# Patient Record
Sex: MALE | Race: WHITE | NOT HISPANIC OR LATINO | Employment: FULL TIME | URBAN - METROPOLITAN AREA
[De-identification: names, ages, dates, MRNs, and addresses within clinical notes are randomized per-mention and may not be internally consistent; named-entity substitution may affect disease eponyms.]

---

## 2017-02-08 RX ORDER — BISOPROLOL FUMARATE 10 MG/1
TABLET, FILM COATED ORAL
Qty: 90 TABLET | Refills: 1 | Status: SHIPPED | OUTPATIENT
Start: 2017-02-08 | End: 2018-05-02 | Stop reason: SDUPTHER

## 2017-05-02 ENCOUNTER — HOSPITAL ENCOUNTER (OUTPATIENT)
Dept: CARDIOLOGY | Facility: HOSPITAL | Age: 42
Discharge: HOME | End: 2017-05-02
Admitting: NURSE PRACTITIONER

## 2017-05-02 ENCOUNTER — OFFICE VISIT (OUTPATIENT)
Dept: INTERNAL MEDICINE | Facility: CLINIC | Age: 42
End: 2017-05-02

## 2017-05-02 VITALS
OXYGEN SATURATION: 98 % | HEIGHT: 76 IN | SYSTOLIC BLOOD PRESSURE: 122 MMHG | HEART RATE: 65 BPM | BODY MASS INDEX: 33 KG/M2 | WEIGHT: 271 LBS | DIASTOLIC BLOOD PRESSURE: 80 MMHG

## 2017-05-02 VITALS — SYSTOLIC BLOOD PRESSURE: 120 MMHG | DIASTOLIC BLOOD PRESSURE: 84 MMHG | HEART RATE: 77 BPM

## 2017-05-02 DIAGNOSIS — R53.83 OTHER FATIGUE: ICD-10-CM

## 2017-05-02 DIAGNOSIS — R07.9 CHEST PAIN, UNSPECIFIED TYPE: ICD-10-CM

## 2017-05-02 DIAGNOSIS — R53.1 WEAKNESS: ICD-10-CM

## 2017-05-02 DIAGNOSIS — R42 DIZZINESS: ICD-10-CM

## 2017-05-02 DIAGNOSIS — E78.5 HYPERLIPIDEMIA, UNSPECIFIED HYPERLIPIDEMIA TYPE: ICD-10-CM

## 2017-05-02 DIAGNOSIS — H53.9 VISION CHANGES: ICD-10-CM

## 2017-05-02 DIAGNOSIS — R07.9 CHEST PAIN, UNSPECIFIED TYPE: Primary | ICD-10-CM

## 2017-05-02 LAB
ALBUMIN SERPL-MCNC: 4.1 G/DL (ref 3.2–4.8)
ALBUMIN/GLOB SERPL: 1.5 G/DL (ref 1.5–2.5)
ALP SERPL-CCNC: 135 U/L (ref 25–100)
ALT SERPL W P-5'-P-CCNC: 34 U/L (ref 7–40)
ANION GAP SERPL CALCULATED.3IONS-SCNC: 11 MMOL/L (ref 3–11)
ARTICHOKE IGE QN: 164 MG/DL (ref 0–130)
AST SERPL-CCNC: 21 U/L (ref 0–33)
BASOPHILS # BLD AUTO: 0.04 10*3/MM3 (ref 0–0.2)
BASOPHILS NFR BLD AUTO: 0.5 % (ref 0–1)
BH CV STRESS BP STAGE 1: NORMAL
BH CV STRESS BP STAGE 2: NORMAL
BH CV STRESS BP STAGE 3: NORMAL
BH CV STRESS DURATION MIN STAGE 1: 3
BH CV STRESS DURATION MIN STAGE 2: 3
BH CV STRESS DURATION MIN STAGE 3: 3
BH CV STRESS DURATION MIN STAGE 4: 1
BH CV STRESS DURATION SEC STAGE 1: 0
BH CV STRESS DURATION SEC STAGE 2: 0
BH CV STRESS DURATION SEC STAGE 3: 0
BH CV STRESS DURATION SEC STAGE 4: 20
BH CV STRESS GRADE STAGE 1: 10
BH CV STRESS GRADE STAGE 2: 12
BH CV STRESS GRADE STAGE 3: 14
BH CV STRESS GRADE STAGE 4: 16
BH CV STRESS HR STAGE 1: 91
BH CV STRESS HR STAGE 2: 109
BH CV STRESS HR STAGE 3: 114
BH CV STRESS HR STAGE 4: 135
BH CV STRESS METS STAGE 1: 5
BH CV STRESS METS STAGE 2: 7.5
BH CV STRESS METS STAGE 3: 10
BH CV STRESS METS STAGE 4: 13.5
BH CV STRESS O2 STAGE 2: 95
BH CV STRESS O2 STAGE 3: 94
BH CV STRESS O2 STAGE 4: 93
BH CV STRESS PROTOCOL 1: NORMAL
BH CV STRESS RECOVERY BP: NORMAL MMHG
BH CV STRESS RECOVERY HR: 88 BPM
BH CV STRESS RECOVERY O2: 93 %
BH CV STRESS SPEED STAGE 1: 1.7
BH CV STRESS SPEED STAGE 2: 2.5
BH CV STRESS SPEED STAGE 3: 3.4
BH CV STRESS SPEED STAGE 4: 4.2
BH CV STRESS STAGE 1: 1
BH CV STRESS STAGE 2: 2
BH CV STRESS STAGE 3: 3
BH CV STRESS STAGE 4: 4
BILIRUB SERPL-MCNC: 0.3 MG/DL (ref 0.3–1.2)
BUN BLD-MCNC: 16 MG/DL (ref 9–23)
BUN/CREAT SERPL: 17.8 (ref 7–25)
CALCIUM SPEC-SCNC: 10.1 MG/DL (ref 8.7–10.4)
CHLORIDE SERPL-SCNC: 108 MMOL/L (ref 99–109)
CHOLEST SERPL-MCNC: 241 MG/DL (ref 0–200)
CO2 SERPL-SCNC: 26 MMOL/L (ref 20–31)
CREAT BLD-MCNC: 0.9 MG/DL (ref 0.6–1.3)
DEPRECATED RDW RBC AUTO: 45.1 FL (ref 37–54)
EOSINOPHIL # BLD AUTO: 0.33 10*3/MM3 (ref 0.1–0.3)
EOSINOPHIL NFR BLD AUTO: 3.7 % (ref 0–3)
ERYTHROCYTE [DISTWIDTH] IN BLOOD BY AUTOMATED COUNT: 13.4 % (ref 11.3–14.5)
GFR SERPL CREATININE-BSD FRML MDRD: 93 ML/MIN/1.73
GLOBULIN UR ELPH-MCNC: 2.7 GM/DL
GLUCOSE BLD-MCNC: 100 MG/DL (ref 70–100)
HCT VFR BLD AUTO: 51.5 % (ref 38.9–50.9)
HDLC SERPL-MCNC: 46 MG/DL (ref 40–60)
HGB BLD-MCNC: 17.2 G/DL (ref 13.1–17.5)
IMM GRANULOCYTES # BLD: 0.01 10*3/MM3 (ref 0–0.03)
IMM GRANULOCYTES NFR BLD: 0.1 % (ref 0–0.6)
LYMPHOCYTES # BLD AUTO: 1.97 10*3/MM3 (ref 0.6–4.8)
LYMPHOCYTES NFR BLD AUTO: 22.3 % (ref 24–44)
MAXIMAL PREDICTED HEART RATE: 178 BPM
MCH RBC QN AUTO: 30.6 PG (ref 27–31)
MCHC RBC AUTO-ENTMCNC: 33.4 G/DL (ref 32–36)
MCV RBC AUTO: 91.6 FL (ref 80–99)
MONOCYTES # BLD AUTO: 0.51 10*3/MM3 (ref 0–1)
MONOCYTES NFR BLD AUTO: 5.8 % (ref 0–12)
NEUTROPHILS # BLD AUTO: 5.99 10*3/MM3 (ref 1.5–8.3)
NEUTROPHILS NFR BLD AUTO: 67.6 % (ref 41–71)
PERCENT MAX PREDICTED HR: 75.28 %
PLATELET # BLD AUTO: 188 10*3/MM3 (ref 150–450)
PMV BLD AUTO: 12.4 FL (ref 6–12)
POTASSIUM BLD-SCNC: 4.8 MMOL/L (ref 3.5–5.5)
PROT SERPL-MCNC: 6.8 G/DL (ref 5.7–8.2)
RBC # BLD AUTO: 5.62 10*6/MM3 (ref 4.2–5.76)
SODIUM BLD-SCNC: 145 MMOL/L (ref 132–146)
STRESS BASELINE BP: NORMAL MMHG
STRESS BASELINE HR: 63 BPM
STRESS PERCENT HR: 89 %
STRESS POST ESTIMATED WORKLOAD: 12.3 METS
STRESS POST EXERCISE DUR MIN: 10 MIN
STRESS POST EXERCISE DUR SEC: 20 SEC
STRESS POST O2 SAT PEAK: 95 %
STRESS POST PEAK BP: NORMAL MMHG
STRESS POST PEAK HR: 134 BPM
STRESS TARGET HR: 151 BPM
TRIGL SERPL-MCNC: 353 MG/DL (ref 0–150)
WBC NRBC COR # BLD: 8.85 10*3/MM3 (ref 3.5–10.8)

## 2017-05-02 PROCEDURE — 80053 COMPREHEN METABOLIC PANEL: CPT | Performed by: NURSE PRACTITIONER

## 2017-05-02 PROCEDURE — 93017 CV STRESS TEST TRACING ONLY: CPT

## 2017-05-02 PROCEDURE — 84403 ASSAY OF TOTAL TESTOSTERONE: CPT | Performed by: NURSE PRACTITIONER

## 2017-05-02 PROCEDURE — 93018 CV STRESS TEST I&R ONLY: CPT | Performed by: INTERNAL MEDICINE

## 2017-05-02 PROCEDURE — 84402 ASSAY OF FREE TESTOSTERONE: CPT | Performed by: NURSE PRACTITIONER

## 2017-05-02 PROCEDURE — 93000 ELECTROCARDIOGRAM COMPLETE: CPT | Performed by: NURSE PRACTITIONER

## 2017-05-02 PROCEDURE — 99214 OFFICE O/P EST MOD 30 MIN: CPT | Performed by: NURSE PRACTITIONER

## 2017-05-02 PROCEDURE — 80061 LIPID PANEL: CPT | Performed by: NURSE PRACTITIONER

## 2017-05-02 PROCEDURE — 85025 COMPLETE CBC W/AUTO DIFF WBC: CPT | Performed by: NURSE PRACTITIONER

## 2017-05-03 DIAGNOSIS — R53.1 WEAKNESS: ICD-10-CM

## 2017-05-03 DIAGNOSIS — R42 DIZZINESS: ICD-10-CM

## 2017-05-03 DIAGNOSIS — H53.9 VISUAL CHANGES: Primary | ICD-10-CM

## 2017-05-04 LAB
CONV COMMENT: NORMAL
TESTOST FREE SERPL-MCNC: 7.4 PG/ML (ref 6.8–21.5)
TESTOST SERPL-MCNC: 390 NG/DL (ref 348–1197)

## 2017-05-09 ENCOUNTER — HOSPITAL ENCOUNTER (OUTPATIENT)
Dept: MRI IMAGING | Facility: HOSPITAL | Age: 42
Discharge: HOME | End: 2017-05-09
Admitting: NURSE PRACTITIONER

## 2017-05-09 DIAGNOSIS — R42 DIZZINESS: ICD-10-CM

## 2017-05-09 DIAGNOSIS — R53.1 WEAKNESS: ICD-10-CM

## 2017-05-09 DIAGNOSIS — H53.9 VISUAL CHANGES: ICD-10-CM

## 2017-05-09 PROCEDURE — A9577 INJ MULTIHANCE: HCPCS | Performed by: NURSE PRACTITIONER

## 2017-05-09 PROCEDURE — 70553 MRI BRAIN STEM W/O & W/DYE: CPT

## 2017-05-09 PROCEDURE — 0 GADOBENATE DIMEGLUMINE 529 MG/ML SOLUTION: Performed by: NURSE PRACTITIONER

## 2017-05-09 RX ADMIN — GADOBENATE DIMEGLUMINE 20 ML: 529 INJECTION, SOLUTION INTRAVENOUS at 15:00

## 2017-05-11 ENCOUNTER — TELEPHONE (OUTPATIENT)
Dept: INTERNAL MEDICINE | Facility: CLINIC | Age: 42
End: 2017-05-11

## 2017-05-11 DIAGNOSIS — H53.9 VISION CHANGES: ICD-10-CM

## 2017-05-11 DIAGNOSIS — R42 DIZZINESS: ICD-10-CM

## 2017-05-11 DIAGNOSIS — R51.9 WORSENING HEADACHES: Primary | ICD-10-CM

## 2017-06-16 RX ORDER — OMEPRAZOLE 20 MG/1
CAPSULE, DELAYED RELEASE ORAL
Qty: 90 CAPSULE | Refills: 3 | Status: SHIPPED | OUTPATIENT
Start: 2017-06-16 | End: 2018-08-18 | Stop reason: SDUPTHER

## 2018-02-22 ENCOUNTER — OFFICE VISIT (OUTPATIENT)
Dept: INTERNAL MEDICINE | Facility: CLINIC | Age: 43
End: 2018-02-22

## 2018-02-22 VITALS
HEART RATE: 88 BPM | DIASTOLIC BLOOD PRESSURE: 82 MMHG | OXYGEN SATURATION: 98 % | BODY MASS INDEX: 33.94 KG/M2 | WEIGHT: 278.8 LBS | SYSTOLIC BLOOD PRESSURE: 124 MMHG

## 2018-02-22 DIAGNOSIS — Z72.0 TOBACCO USE: ICD-10-CM

## 2018-02-22 DIAGNOSIS — G89.29 CHRONIC BILATERAL LOW BACK PAIN WITHOUT SCIATICA: Primary | ICD-10-CM

## 2018-02-22 DIAGNOSIS — G89.29 CHRONIC PAIN OF RIGHT KNEE: ICD-10-CM

## 2018-02-22 DIAGNOSIS — M25.561 CHRONIC PAIN OF RIGHT KNEE: ICD-10-CM

## 2018-02-22 DIAGNOSIS — M62.830 BACK MUSCLE SPASM: ICD-10-CM

## 2018-02-22 DIAGNOSIS — M54.50 CHRONIC BILATERAL LOW BACK PAIN WITHOUT SCIATICA: Primary | ICD-10-CM

## 2018-02-22 DIAGNOSIS — G47.30 SLEEP APNEA, UNSPECIFIED TYPE: ICD-10-CM

## 2018-02-22 PROCEDURE — 99406 BEHAV CHNG SMOKING 3-10 MIN: CPT | Performed by: NURSE PRACTITIONER

## 2018-02-22 PROCEDURE — 99214 OFFICE O/P EST MOD 30 MIN: CPT | Performed by: NURSE PRACTITIONER

## 2018-02-22 PROCEDURE — 96372 THER/PROPH/DIAG INJ SC/IM: CPT | Performed by: NURSE PRACTITIONER

## 2018-02-22 RX ORDER — TIZANIDINE 4 MG/1
4 TABLET ORAL EVERY 6 HOURS PRN
Qty: 20 TABLET | Refills: 0 | Status: SHIPPED | OUTPATIENT
Start: 2018-02-22

## 2018-02-22 RX ORDER — KETOROLAC TROMETHAMINE 30 MG/ML
60 INJECTION, SOLUTION INTRAMUSCULAR; INTRAVENOUS ONCE
Status: DISCONTINUED | OUTPATIENT
Start: 2018-02-22 | End: 2018-02-22

## 2018-02-22 RX ORDER — KETOROLAC TROMETHAMINE 30 MG/ML
60 INJECTION, SOLUTION INTRAMUSCULAR; INTRAVENOUS ONCE
Status: COMPLETED | OUTPATIENT
Start: 2018-02-22 | End: 2018-02-22

## 2018-02-22 RX ORDER — NICOTINE 21 MG/24HR
1 PATCH, TRANSDERMAL 24 HOURS TRANSDERMAL EVERY 24 HOURS
Qty: 28 PATCH | Refills: 0 | Status: SHIPPED | OUTPATIENT
Start: 2018-02-22

## 2018-02-22 RX ORDER — PREDNISONE 20 MG/1
TABLET ORAL
COMMUNITY
Start: 2018-01-23 | End: 2018-02-22

## 2018-02-22 RX ORDER — PREDNISONE 20 MG/1
TABLET ORAL
Qty: 19 TABLET | Refills: 0 | Status: SHIPPED | OUTPATIENT
Start: 2018-02-22

## 2018-02-22 RX ADMIN — KETOROLAC TROMETHAMINE 60 MG: 30 INJECTION, SOLUTION INTRAMUSCULAR; INTRAVENOUS at 16:27

## 2018-02-22 NOTE — PROGRESS NOTES
Subjective  Knee Pain (& Myalgia)      Lavelle Chino is a 42 y.o. male.     History of Present Illness   Pain in right knee and right elbow that started 2 weeks ago. Pt has taken ibuprofen and it hasn't helped. Nothing makes pain better or worse.     Headache for 3 weeks that is bilateral and is a pressure type feeling. Pt has taken ibuprofen and it hasn't helped. Nothing makes pain better or worse.     Pt having hard time breathing. Pt diagnosed with URI and pleurisy 1 mo ago and completed steroid, ABX, and inhaler. Still short of breath regardless of activity or none.     Pt smokes less than a PPD. Pt interested in quitting smoking.     Pt has knot on right thumb which is painful- started about 1 yr ago. Started hurting over past month.     Pt complains of not being able to lose weight and being bloated. He walks 3 mi every day. Pt eats unhealthy and fast food. Pt drinks soda every day. Pt constantly feels bloated.    Pt complains of not being able to sleep and feeling tired.  Pt sleeps 2.5-3 hrs per night. Pt tried OTC sleep aid pills and doesn't help.                 The following portions of the patient's history were reviewed and updated as appropriate: current medications, past social history, past surgical history and problem list.    Review of Systems   Constitutional: Positive for fatigue.   Respiratory: Positive for shortness of breath. Negative for chest tightness.         Smoker   Cardiovascular: Negative for chest pain, palpitations and leg swelling.   Gastrointestinal:        Bloating   Musculoskeletal: Positive for back pain.        Right knee pain and right elbow pain. Knot on right thumb that hurts.    Neurological: Positive for headaches.   Psychiatric/Behavioral: Positive for sleep disturbance.   All other systems reviewed and are negative.      Objective   Physical Exam   Constitutional: He is oriented to person, place, and time. He appears well-developed and well-nourished.   HENT:   Head:  Normocephalic.   Eyes: Conjunctivae are normal.   Neck: Neck supple. No tracheal deviation present. No thyromegaly present.   Cardiovascular: Normal rate and regular rhythm.    Pulmonary/Chest: Effort normal and breath sounds normal.   Musculoskeletal: He exhibits tenderness.        Lumbar back: He exhibits decreased range of motion, tenderness, swelling, pain and spasm.        Right forearm: He exhibits tenderness.   Right elbow painful to move when flexing toward body.   Right knee has crepitus with movement and is painful to move.   Neurological: He is alert and oriented to person, place, and time. He has normal reflexes.   Skin: Skin is warm and dry.   Psychiatric: He has a normal mood and affect. His behavior is normal. Judgment and thought content normal.   Nursing note and vitals reviewed.    /82 (BP Location: Left arm, Patient Position: Sitting, Cuff Size: Adult)  Pulse 88  Wt 126 kg (278 lb 12.8 oz)  SpO2 98%  BMI 33.94 kg/m2    Assessment/Plan   Problems Addressed this Visit        Nervous and Auditory    Chronic bilateral low back pain without sciatica - Primary    Relevant Medications    ketorolac (TORADOL) injection 60 mg (Completed)    predniSONE (DELTASONE) 20 MG tablet    diclofenac (VOLTAREN) 50 MG EC tablet    Other Relevant Orders    MRI Lumbar Spine Without Contrast       Musculoskeletal and Integument    Chronic pain of right knee    Relevant Medications    ketorolac (TORADOL) injection 60 mg (Completed)    predniSONE (DELTASONE) 20 MG tablet    diclofenac (VOLTAREN) 50 MG EC tablet    Back muscle spasm    Relevant Medications    tiZANidine (ZANAFLEX) 4 MG tablet       Other    Tobacco use    Relevant Medications    nicotine (EQL NICOTINE) 21 MG/24HR patch    Sleep apnea    Relevant Orders    Ambulatory Referral to Sleep Medicine        Smoking Cessation Counseling  DX:tobacco abuse  I advised patient to quit, and offered support.  Discussed current use pattern.  Nicotine patches  beginning at 21 mg.  Barriers: pt ready to quit  Time spent counseling: > 3-10 minutes              Medications ordered for pain in joints and muscle spasms. Referral for sleep study ordered. POC discussed with pt. F/U in 1 mo.

## 2018-03-05 ENCOUNTER — HOSPITAL ENCOUNTER (OUTPATIENT)
Dept: MRI IMAGING | Facility: HOSPITAL | Age: 43
Discharge: HOME | End: 2018-03-05
Admitting: NURSE PRACTITIONER

## 2018-03-05 ENCOUNTER — TRANSCRIBE ORDERS (OUTPATIENT)
Dept: INTERNAL MEDICINE | Facility: CLINIC | Age: 43
End: 2018-03-05

## 2018-03-05 ENCOUNTER — HOSPITAL ENCOUNTER (OUTPATIENT)
Dept: GENERAL RADIOLOGY | Facility: HOSPITAL | Age: 43
Discharge: HOME | End: 2018-03-05

## 2018-03-05 DIAGNOSIS — M25.561 RIGHT KNEE PAIN, UNSPECIFIED CHRONICITY: ICD-10-CM

## 2018-03-05 DIAGNOSIS — G89.29 CHRONIC BILATERAL LOW BACK PAIN WITHOUT SCIATICA: ICD-10-CM

## 2018-03-05 DIAGNOSIS — M25.561 RIGHT KNEE PAIN, UNSPECIFIED CHRONICITY: Primary | ICD-10-CM

## 2018-03-05 DIAGNOSIS — M54.50 CHRONIC BILATERAL LOW BACK PAIN WITHOUT SCIATICA: ICD-10-CM

## 2018-03-05 PROCEDURE — 73560 X-RAY EXAM OF KNEE 1 OR 2: CPT

## 2018-03-05 PROCEDURE — 72148 MRI LUMBAR SPINE W/O DYE: CPT

## 2018-03-07 DIAGNOSIS — M54.42 ACUTE BILATERAL LOW BACK PAIN WITH BILATERAL SCIATICA: Primary | ICD-10-CM

## 2018-03-07 DIAGNOSIS — M54.41 ACUTE BILATERAL LOW BACK PAIN WITH BILATERAL SCIATICA: Primary | ICD-10-CM

## 2018-03-08 ENCOUNTER — TELEPHONE (OUTPATIENT)
Dept: INTERNAL MEDICINE | Facility: CLINIC | Age: 43
End: 2018-03-08

## 2018-03-08 DIAGNOSIS — M25.561 CHRONIC PAIN OF RIGHT KNEE: Primary | ICD-10-CM

## 2018-03-08 DIAGNOSIS — G89.29 CHRONIC PAIN OF RIGHT KNEE: Primary | ICD-10-CM

## 2018-03-15 ENCOUNTER — TELEPHONE (OUTPATIENT)
Dept: INTERNAL MEDICINE | Facility: CLINIC | Age: 43
End: 2018-03-15

## 2018-03-15 NOTE — TELEPHONE ENCOUNTER
----- Message from ANGEL Manriquez sent at 3/14/2018  8:49 AM EDT -----  May call in ultram 50mg 1-2 po q8h prn #60  ----- Message -----  From: Brandon Byrnes MA  Sent: 3/13/2018  12:19 PM  To: ANGEL Manriquez    Letter was faxed. Pt stated that he would like some Ultram.

## 2018-03-20 ENCOUNTER — TELEPHONE (OUTPATIENT)
Dept: INTERNAL MEDICINE | Facility: CLINIC | Age: 43
End: 2018-03-20

## 2018-03-20 DIAGNOSIS — J30.89 SEASONAL ALLERGIC RHINITIS DUE TO OTHER ALLERGIC TRIGGER, UNSPECIFIED CHRONICITY: ICD-10-CM

## 2018-03-20 RX ORDER — CETIRIZINE HYDROCHLORIDE 10 MG/1
10 TABLET ORAL DAILY
Qty: 30 TABLET | Refills: 11 | Status: SHIPPED | OUTPATIENT
Start: 2018-03-20

## 2018-03-20 NOTE — TELEPHONE ENCOUNTER
Per Gabriella ORTIZ, called in pt Tramadol Rx to [facility].     Tramadol 50mg Q: 60 1-2 q8h prn w/ 0 refills.     Pt notified.

## 2018-05-02 RX ORDER — BISOPROLOL FUMARATE 10 MG/1
10 TABLET, FILM COATED ORAL DAILY
Qty: 90 TABLET | Refills: 1 | Status: SHIPPED | OUTPATIENT
Start: 2018-05-02

## 2018-08-19 RX ORDER — OMEPRAZOLE 20 MG/1
CAPSULE, DELAYED RELEASE ORAL
Qty: 30 CAPSULE | Refills: 0 | Status: SHIPPED | OUTPATIENT
Start: 2018-08-19

## 2019-05-14 DIAGNOSIS — J30.89 SEASONAL ALLERGIC RHINITIS DUE TO OTHER ALLERGIC TRIGGER: ICD-10-CM

## 2019-05-14 RX ORDER — CETIRIZINE HYDROCHLORIDE 10 MG/1
TABLET ORAL
Qty: 90 TABLET | Refills: 10 | OUTPATIENT
Start: 2019-05-14

## 2019-07-30 DIAGNOSIS — J30.89 SEASONAL ALLERGIC RHINITIS DUE TO OTHER ALLERGIC TRIGGER: ICD-10-CM

## 2019-07-30 RX ORDER — CETIRIZINE HYDROCHLORIDE 10 MG/1
TABLET ORAL
Qty: 90 TABLET | Refills: 10 | OUTPATIENT
Start: 2019-07-30

## 2019-11-21 DIAGNOSIS — J30.89 SEASONAL ALLERGIC RHINITIS DUE TO OTHER ALLERGIC TRIGGER: ICD-10-CM

## 2019-11-21 RX ORDER — CETIRIZINE HYDROCHLORIDE 10 MG/1
TABLET ORAL
Qty: 90 TABLET | Refills: 10 | OUTPATIENT
Start: 2019-11-21

## 2025-04-21 ENCOUNTER — OFFICE VISIT (OUTPATIENT)
Dept: CARDIOLOGY | Facility: CLINIC | Age: 50
End: 2025-04-21
Payer: COMMERCIAL

## 2025-04-21 VITALS
BODY MASS INDEX: 32.76 KG/M2 | OXYGEN SATURATION: 96 % | WEIGHT: 269 LBS | HEIGHT: 76 IN | HEART RATE: 67 BPM | DIASTOLIC BLOOD PRESSURE: 80 MMHG | SYSTOLIC BLOOD PRESSURE: 114 MMHG

## 2025-04-21 DIAGNOSIS — F51.05 INSOMNIA DUE TO OTHER MENTAL DISORDER: ICD-10-CM

## 2025-04-21 DIAGNOSIS — G47.33 OBSTRUCTIVE SLEEP APNEA SYNDROME: Primary | ICD-10-CM

## 2025-04-21 DIAGNOSIS — F99 INSOMNIA DUE TO OTHER MENTAL DISORDER: ICD-10-CM

## 2025-04-21 DIAGNOSIS — G47.19 EXCESSIVE DAYTIME SLEEPINESS: ICD-10-CM

## 2025-04-21 PROCEDURE — 99204 OFFICE O/P NEW MOD 45 MIN: CPT | Performed by: NURSE PRACTITIONER

## 2025-04-21 RX ORDER — LAMOTRIGINE 25 MG/1
TABLET ORAL
COMMUNITY
Start: 2025-04-02

## 2025-04-21 RX ORDER — DULOXETIN HYDROCHLORIDE 30 MG/1
CAPSULE, DELAYED RELEASE ORAL
COMMUNITY
Start: 2025-04-02

## 2025-04-21 RX ORDER — ALBUTEROL SULFATE AND BUDESONIDE 90; 80 UG/1; UG/1
AEROSOL, METERED RESPIRATORY (INHALATION)
COMMUNITY
Start: 2025-04-02

## 2025-04-21 RX ORDER — CARIPRAZINE 1.5 MG/1
CAPSULE, GELATIN COATED ORAL
COMMUNITY
Start: 2025-04-07

## 2025-04-21 NOTE — ASSESSMENT & PLAN NOTE
He reports that he is frequently fatigued and drowsy during the day.    He reports that his [place] Sleepiness Scale is 17.    He is advised of risk of untreated sleep apnea including if he is sleepy when he is driving he is to not drive.  Patient verbalized understanding.    Plan:    Restudy for sleep apnea    Consider if CPAP needs to be restarted    Reevaluate symptoms on CPAP

## 2025-04-21 NOTE — ASSESSMENT & PLAN NOTE
He reports that he feels like he is a very light sleeper.    He reports he is in the bed 7 hours a night but he only feels like he sleeps just a few hours.    He reports that he feels like he just cannot turn his brain off.    He has coexisting conditions of anxiety and depression.    He reports in the past when he wore his CPAP that he could sleep better.    Plan:    He is advised to wean off of his caffeine intake.  He is drinking 4-5 Coca-Cola's with caffeine daily.    We discussed sleep hygiene    We discussed restudy in for sleep apnea and restarting his PAP therapy since he slept better on CPAP.

## 2025-04-21 NOTE — ASSESSMENT & PLAN NOTE
He has a known history of mild sleep apnea.  Baseline AHI was 9 on home sleep study in 2019.    He has been off of CPAP therapy for about 3 years.  His CPAP was in the recall and never was replaced.    He has symptoms of excessive daytime sleepiness.  [place] Sleepiness Scale is 17.    He has symptoms of snoring and witnessed apneas and waking up gasping for air.    He reports that he slept better and felt better on CPAP therapy.  He is willing to retest and restart PAP therapy.    Plan:    Home sleep test for further evaluation    Follow-up after testing to review the findings
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
## 2025-05-12 ENCOUNTER — RESULTS FOLLOW-UP (OUTPATIENT)
Dept: CARDIOLOGY | Facility: CLINIC | Age: 50
End: 2025-05-12
Payer: COMMERCIAL

## 2025-05-12 DIAGNOSIS — G47.33 OBSTRUCTIVE SLEEP APNEA SYNDROME: ICD-10-CM

## 2025-05-12 DIAGNOSIS — G47.19 EXCESSIVE DAYTIME SLEEPINESS: ICD-10-CM

## 2025-05-15 DIAGNOSIS — G47.19 EXCESSIVE DAYTIME SLEEPINESS: ICD-10-CM

## 2025-05-15 DIAGNOSIS — G47.33 OBSTRUCTIVE SLEEP APNEA SYNDROME: Primary | ICD-10-CM

## 2025-06-12 ENCOUNTER — TELEPHONE (OUTPATIENT)
Dept: CARDIOLOGY | Facility: CLINIC | Age: 50
End: 2025-06-12
Payer: COMMERCIAL

## 2025-06-12 NOTE — TELEPHONE ENCOUNTER
LVM FOR PT TO CALL TO [facility] THIS APPT OUT 1 MONTH AS PT WAS JUST SET UP ON PAP MACHINE ON 6.12.25  HUB OK TO RELAY AND RESCHEDULE TO MID AUGUST WITH ANTHONY ORTIZ.

## 2025-07-07 ENCOUNTER — OFFICE VISIT (OUTPATIENT)
Dept: CARDIOLOGY | Facility: CLINIC | Age: 50
End: 2025-07-07
Payer: COMMERCIAL

## 2025-07-07 VITALS
DIASTOLIC BLOOD PRESSURE: 76 MMHG | HEIGHT: 76 IN | HEART RATE: 67 BPM | SYSTOLIC BLOOD PRESSURE: 130 MMHG | BODY MASS INDEX: 32.63 KG/M2 | WEIGHT: 268 LBS | OXYGEN SATURATION: 96 %

## 2025-07-07 DIAGNOSIS — G47.33 OBSTRUCTIVE SLEEP APNEA SYNDROME: Primary | ICD-10-CM

## 2025-07-07 DIAGNOSIS — F51.05 INSOMNIA DUE TO OTHER MENTAL DISORDER: ICD-10-CM

## 2025-07-07 DIAGNOSIS — G47.19 EXCESSIVE DAYTIME SLEEPINESS: ICD-10-CM

## 2025-07-07 DIAGNOSIS — F99 INSOMNIA DUE TO OTHER MENTAL DISORDER: ICD-10-CM

## 2025-07-07 PROCEDURE — 99213 OFFICE O/P EST LOW 20 MIN: CPT | Performed by: NURSE PRACTITIONER

## 2025-07-07 RX ORDER — ERGOCALCIFEROL 1.25 MG/1
CAPSULE, LIQUID FILLED ORAL
COMMUNITY
Start: 2025-04-27

## 2025-07-07 RX ORDER — EZETIMIBE 10 MG
10 TABLET ORAL DAILY
COMMUNITY
Start: 2025-05-02 | End: 2025-07-31

## 2025-07-07 NOTE — PROGRESS NOTES
Follow-Up Sleep Consult     Date:   2025  Name: Lavelle Chino  :   1975  PCP: Eun Kramer APRN    Chief Complaint   Patient presents with    Sleep Apnea       Subjective     History of Present Illness  Lavelle Chino is a 49 y.o. male who presents today for follow-up on KRISTINA.  He comes in today on his new CPAP.    He has been on his new CPAP for 34 nights.  He reports that he is sleeping better.  Feeling better.  But he has had a little adjustment to being on PAP machine.  His adjustment with the mask fit was to trim his beard and now that has greatly improved in the last 2 weeks.    Sleep history and testing:    KRISTINA baseline AHI 9 on HST 3/28/2019    HST 2025 AHI 5.5 / 13 supine     Auto CPAP     Coexisting conditions:  Hypertension  Hyperlipidemia  COPD/tobacco use  Anxiety and depression      Current mask used is FFM     Device Functioning Well: Yes  Mask Fit Comfortable: Yes - he trimmed his beard and this helped the mask fit.   Air Flow Comfortable: Yes  DME Helpful for Supplies: Yes  Sleep is rested: Yes, he knows that he is sleeping better with his PAP on. He sleeps longer hours and more rested on CPAP. He has been adjusting to his CPAP.  He has had a back problem and pain has interrupted his sleep.     Device Download:                 The patient's relevant past medical, surgical, family, and social history reviewed and updated in Epic as appropriate.    Past Medical History:   Diagnosis Date    Abnormal kidney function study     Acute bronchitis     Allergic rhinitis     Cellulitis     Depression     Diabetes mellitus     Elevated blood urea nitrogen     Esophageal reflux     Flank pain     Foot pain     Hyperkalemia     Hyperlipidemia     Hypertension     Hypogonadism male     Influenza     Insomnia     Joint pain     Lower back pain     Nonvenomous insect bite of multiple sites     Pain in hand     Pruritus     Tobacco use     Tooth abscess     Vitamin D deficiency   "    History reviewed. No pertinent surgical history.    Allergies   Allergen Reactions    Atorvastatin Calcium Myalgia    Dicloxacillin Unknown - Low Severity     Prior to Admission medications    Medication Sig Start Date End Date Taking? Authorizing Provider   tiZANidine (ZANAFLEX) 4 MG tablet  6/24/25  Yes Dia Cortés MD   vitamin D (ERGOCALCIFEROL) 1.25 MG (35676 UT) capsule capsule  4/27/25  Yes Dia Cortés MD   Zetia 10 MG tablet Take 1 tablet by mouth Daily. 5/2/25 7/31/25 Yes Dia Cortés MD   Airsupra 90-80 MCG/ACT aerosol  4/2/25   Dia Cortés MD   bisoprolol (ZEBeta) 10 MG tablet Take 1 tablet by mouth Daily. 5/2/18   Gabriella Palmer APRN   DULoxetine (CYMBALTA) 30 MG capsule  4/2/25   ProviderDia MD   lamoTRIgine (LaMICtal) 25 MG tablet  4/2/25   Dia Cortés MD   metFORMIN (GLUCOPHAGE) 500 MG tablet  4/2/25   Dia Cortés MD   Vraylar 1.5 MG capsule capsule  4/7/25   ProviderDia MD     Family History   Problem Relation Age of Onset    Heart attack Father     Heart attack Brother     Diabetes Brother     Heart attack Paternal Uncle     Heart attack Maternal Grandfather     Heart attack Paternal Grandfather        Objective     Vital Signs:  /76 (BP Location: Right arm, Patient Position: Sitting, Cuff Size: Large Adult)   Pulse 67   Ht 193 cm (76\")   Wt 122 kg (268 lb)   SpO2 96%   BMI 32.62 kg/m²              Physical Exam  HENT:      Head: Normocephalic.      Nose: Nose normal.      Mouth/Throat:      Mouth: Mucous membranes are moist.   Pulmonary:      Effort: Pulmonary effort is normal.   Skin:     General: Skin is warm and dry.   Neurological:      Mental Status: He is alert and oriented to person, place, and time.   Psychiatric:         Mood and Affect: Mood normal.         Behavior: Behavior normal.         Thought Content: Thought content normal.         The following data was reviewed by: Stefanie RING" ANGEL Donohue on 07/07/2025:    PAP download reviewed: 30-day download as above.  I have reviewed and interpreted the data on the download at today's visit         Assessment and Plan     Diagnoses and all orders for this visit:    1. Obstructive sleep apnea syndrome (Primary)  Assessment & Plan:  Baseline AHI of 9 in 2019.  He did restudy and his baseline AHI is now 6/13 in supine position.    He has restarted CPAP.  He has been on his new CPAP for 34 nights.    His download is reviewed with good control and suboptimal but improving compliance.  He reports now that he has a good mask fit that it is easier to use his PAP device.    He is benefiting from CPAP therapy.    We plan to continue CPAP therapy.    Plan follow-up on new CPAP in about 4 to 6 weeks and he is encouraged to increase use of CPAP to include all hours of sleep.      2. Excessive daytime sleepiness  Assessment & Plan:  Excessive daytime sleepiness is improving on CPAP therapy.      3. Insomnia due to other mental disorder  Assessment & Plan:  He reports that sleep hours and quality are both improving on CPAP.    Coexisting conditions of anxiety and depression.    Coexisting caffeine intake of 4-5 Coca-Cola sodas per day.  He is working on decreasing this amount.    Plan:    Continue CPAP    Continue good sleep hygiene measures          Report if any new/changing symptoms immediately and Increase pap therapy usage         Follow Up  Return in about 1 month (around 8/7/2025) for KRISTINA/ 31-90 days new cpap .  Patient was given instructions and counseling regarding his condition or for health maintenance advice. Please see specific information pulled into the AVS if appropriate.

## 2025-07-07 NOTE — ASSESSMENT & PLAN NOTE
He reports that sleep hours and quality are both improving on CPAP.    Coexisting conditions of anxiety and depression.    Coexisting caffeine intake of 4-5 Coca-Cola sodas per day.  He is working on decreasing this amount.    Plan:    Continue CPAP    Continue good sleep hygiene measures

## 2025-07-07 NOTE — ASSESSMENT & PLAN NOTE
Baseline AHI of 9 in 2019.  He did restudy and his baseline AHI is now 6/13 in supine position.    He has restarted CPAP.  He has been on his new CPAP for 34 nights.    His download is reviewed with good control and suboptimal but improving compliance.  He reports now that he has a good mask fit that it is easier to use his PAP device.    He is benefiting from CPAP therapy.    We plan to continue CPAP therapy.    Plan follow-up on new CPAP in about 4 to 6 weeks and he is encouraged to increase use of CPAP to include all hours of sleep.

## 2025-08-11 ENCOUNTER — OFFICE VISIT (OUTPATIENT)
Dept: CARDIOLOGY | Facility: CLINIC | Age: 50
End: 2025-08-11
Payer: COMMERCIAL

## 2025-08-11 VITALS
SYSTOLIC BLOOD PRESSURE: 124 MMHG | DIASTOLIC BLOOD PRESSURE: 64 MMHG | HEIGHT: 76 IN | HEART RATE: 76 BPM | BODY MASS INDEX: 32.15 KG/M2 | OXYGEN SATURATION: 96 % | WEIGHT: 264 LBS

## 2025-08-11 DIAGNOSIS — I48.0 PAROXYSMAL ATRIAL FIBRILLATION: ICD-10-CM

## 2025-08-11 DIAGNOSIS — R06.02 SHORTNESS OF BREATH: ICD-10-CM

## 2025-08-11 DIAGNOSIS — R07.2 PRECORDIAL PAIN: Primary | ICD-10-CM

## 2025-08-11 PROCEDURE — 93000 ELECTROCARDIOGRAM COMPLETE: CPT | Performed by: NURSE PRACTITIONER

## 2025-08-11 PROCEDURE — 99214 OFFICE O/P EST MOD 30 MIN: CPT | Performed by: NURSE PRACTITIONER

## 2025-08-11 RX ORDER — CHLORHEXIDINE GLUCONATE ORAL RINSE 1.2 MG/ML
SOLUTION DENTAL
COMMUNITY
Start: 2025-08-01

## 2025-08-11 RX ORDER — ALBUTEROL SULFATE 90 UG/1
INHALANT RESPIRATORY (INHALATION)
COMMUNITY
Start: 2025-08-01

## 2025-08-11 RX ORDER — NITROGLYCERIN 0.4 MG/1
TABLET SUBLINGUAL
COMMUNITY
Start: 2025-08-04

## 2025-08-11 RX ORDER — CIPROFLOXACIN HYDROCHLORIDE 500 MG/1
500 TABLET, FILM COATED ORAL 2 TIMES DAILY
COMMUNITY
Start: 2025-07-29

## 2025-08-13 ENCOUNTER — TRANSCRIBE ORDERS (OUTPATIENT)
Dept: ADMINISTRATIVE | Facility: HOSPITAL | Age: 50
End: 2025-08-13
Payer: COMMERCIAL

## 2025-08-13 DIAGNOSIS — R07.9 CHEST PAIN, UNSPECIFIED TYPE: Primary | ICD-10-CM

## 2025-08-22 ENCOUNTER — TELEPHONE (OUTPATIENT)
Dept: CARDIOLOGY | Facility: CLINIC | Age: 50
End: 2025-08-22
Payer: COMMERCIAL

## 2025-08-27 ENCOUNTER — OFFICE VISIT (OUTPATIENT)
Dept: CARDIOLOGY | Facility: CLINIC | Age: 50
End: 2025-08-27
Payer: COMMERCIAL

## 2025-08-27 VITALS
SYSTOLIC BLOOD PRESSURE: 134 MMHG | OXYGEN SATURATION: 97 % | WEIGHT: 265 LBS | HEART RATE: 75 BPM | BODY MASS INDEX: 32.27 KG/M2 | HEIGHT: 76 IN | DIASTOLIC BLOOD PRESSURE: 78 MMHG

## 2025-08-27 DIAGNOSIS — R06.02 SHORTNESS OF BREATH: ICD-10-CM

## 2025-08-27 DIAGNOSIS — R94.39 ABNORMAL NUCLEAR STRESS TEST: Primary | ICD-10-CM

## 2025-08-27 DIAGNOSIS — E55.9 VITAMIN D DEFICIENCY: ICD-10-CM

## 2025-08-27 DIAGNOSIS — R07.2 PRECORDIAL PAIN: ICD-10-CM

## 2025-08-27 DIAGNOSIS — E78.2 MIXED HYPERLIPIDEMIA: ICD-10-CM

## 2025-08-27 DIAGNOSIS — I10 PRIMARY HYPERTENSION: ICD-10-CM

## 2025-08-27 DIAGNOSIS — E11.9 TYPE 2 DIABETES MELLITUS WITHOUT COMPLICATION, WITHOUT LONG-TERM CURRENT USE OF INSULIN: ICD-10-CM

## 2025-08-27 RX ORDER — IVABRADINE 5 MG/1
15 TABLET, FILM COATED ORAL ONCE
OUTPATIENT
Start: 2025-08-27 | End: 2025-08-27

## 2025-08-27 RX ORDER — SODIUM CHLORIDE 9 MG/ML
40 INJECTION, SOLUTION INTRAVENOUS AS NEEDED
OUTPATIENT
Start: 2025-08-27

## 2025-08-27 RX ORDER — NITROGLYCERIN 0.4 MG/1
0.4 TABLET SUBLINGUAL
OUTPATIENT
Start: 2025-08-27 | End: 2025-08-27

## 2025-08-27 RX ORDER — NITROGLYCERIN 0.4 MG/1
0.8 TABLET SUBLINGUAL
OUTPATIENT
Start: 2025-08-27

## 2025-08-27 RX ORDER — METOPROLOL TARTRATE 25 MG/1
100 TABLET, FILM COATED ORAL ONCE
OUTPATIENT
Start: 2025-08-27

## 2025-08-27 RX ORDER — METOPROLOL TARTRATE 25 MG/1
50 TABLET, FILM COATED ORAL ONCE
OUTPATIENT
Start: 2025-08-27

## 2025-08-27 RX ORDER — SODIUM CHLORIDE 0.9 % (FLUSH) 0.9 %
10 SYRINGE (ML) INJECTION EVERY 12 HOURS SCHEDULED
OUTPATIENT
Start: 2025-08-27

## 2025-08-27 RX ORDER — METOPROLOL TARTRATE 50 MG
50 TABLET ORAL
OUTPATIENT
Start: 2025-08-27

## 2025-08-27 RX ORDER — FLUTICASONE FUROATE, UMECLIDINIUM BROMIDE AND VILANTEROL TRIFENATATE 100; 62.5; 25 UG/1; UG/1; UG/1
1 POWDER RESPIRATORY (INHALATION)
COMMUNITY
Start: 2025-08-18

## 2025-08-27 RX ORDER — METOPROLOL TARTRATE 1 MG/ML
5 INJECTION, SOLUTION INTRAVENOUS
OUTPATIENT
Start: 2025-08-27

## 2025-08-27 RX ORDER — METOPROLOL TARTRATE 25 MG/1
150 TABLET, FILM COATED ORAL ONCE
OUTPATIENT
Start: 2025-08-27

## 2025-08-27 RX ORDER — METOPROLOL TARTRATE 25 MG/1
200 TABLET, FILM COATED ORAL ONCE
OUTPATIENT
Start: 2025-08-27 | End: 2025-08-27

## 2025-08-27 RX ORDER — METOPROLOL TARTRATE 50 MG
TABLET ORAL
Qty: 2 TABLET | Refills: 0 | Status: SHIPPED | OUTPATIENT
Start: 2025-08-27

## 2025-08-27 RX ORDER — SODIUM CHLORIDE 0.9 % (FLUSH) 0.9 %
10 SYRINGE (ML) INJECTION AS NEEDED
OUTPATIENT
Start: 2025-08-27

## 2025-08-29 ENCOUNTER — TELEPHONE (OUTPATIENT)
Dept: CARDIOLOGY | Facility: CLINIC | Age: 50
End: 2025-08-29
Payer: COMMERCIAL